# Patient Record
Sex: FEMALE | Race: WHITE
[De-identification: names, ages, dates, MRNs, and addresses within clinical notes are randomized per-mention and may not be internally consistent; named-entity substitution may affect disease eponyms.]

---

## 2018-07-14 ENCOUNTER — HOSPITAL ENCOUNTER (INPATIENT)
Dept: HOSPITAL 43 - DL.OBCHECK | Age: 28
LOS: 1 days | Discharge: HOME | DRG: 560 | End: 2018-07-15
Admitting: FAMILY MEDICINE
Payer: COMMERCIAL

## 2018-07-14 DIAGNOSIS — Z88.2: ICD-10-CM

## 2018-07-14 DIAGNOSIS — Z3A.39: ICD-10-CM

## 2018-07-14 PROCEDURE — 0KQM0ZZ REPAIR PERINEUM MUSCLE, OPEN APPROACH: ICD-10-PCS

## 2018-07-14 RX ADMIN — VITAMIN A, ASCORBIC ACID, CHOLECALCIFEROL, .ALPHA.-TOCOPHEROL ACETATE, DL-, THIAMINE MONONITRATE, RIBOFLAVIN, NIACINAMIDE, PYRIDOXINE HYDROCHLORIDE, FOLIC ACID, CYANOCOBALAMIN, CALCIUM CARBONATE, IRON, ZINC OXIDE, AND CUPRIC OXIDE SCH EACH: 4000; 120; 400; 22; 1.84; 3; 20; 10; 1; 12; 200; 29; 25; 2 TABLET ORAL at 08:31

## 2018-07-14 NOTE — HP
CHIEF COMPLAINT:  "I am in labor."

 

HISTORY OF PRESENT ILLNESS:  Mrs. Flowers is a 28-year-old,  2, para 1-0-0-

1  female.  Last menstrual period on 2017, EDC 2018 by an 8

and 2/7 week ultrasound, EGA 39 weeks' gestation, who reported to Labor and

Delivery at [facility] in [place] with increasing force and frequency of

contractions.  On admission, she was noted to be 4 cm dilated.  After admission,

she had spontaneous rupture of membranes and her contractions became

increasingly painful.  She dilated to 6 cm and desires something for pain so

fentanyl IV was given.  She tolerated her contractions quite well.  She had some

bloody show.  No nausea, vomiting, or diarrhea.  No fever or chills.  No

hematochezia or hematemesis.  No dysuria, frequency, or urgency with urination.

No leg pain, leg edema, or severe back pain.  She did have abdominal pain from

the contractions and she was in distress secondary to the contractions.  She had

no issues throughout the pregnancy.

 

PAST MEDICAL HISTORY:  Positive for depression and reactive airway disease,

which should be handled.  She denies any thyroid disease, cancer, diabetes,

hypertension, heart disease, seizure disorder, thromboembolic disorder, breast

lesions, blood transfusions, kidney disease, or lung problems.

 

FAMILY HISTORY:  Positive for breast cancer in maternal grandmother, depression,

diabetes, hypertension, migraine headaches, multiple sclerosis, Parkinson

syndrome, and uterine cancer.

 

OBSTETRICAL HISTORY:  Normal spontaneous vaginal delivery of a viable female

infant weighing 6 pounds 6 ounces at 40 weeks' gestation on 2017.

 

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  She is 

to [place].  She denies any tobacco use, alcohol use, or illicit drug use.

 

ALLERGIES:  Bactrim and minocycline.

 

 

 

MEDICATIONS:  Prenatal vitamins.

 

PAST SURGICAL HISTORY:  Tonsillectomy, PE tube placement, and wisdom teeth

extractions.

 

REVIEW OF SYSTEMS:

All pertinent positive and negative review of systems per HPI.  All other

systems reviewed and are negative.  A 10-point review of systems discussed with

the patient.  She has no issues.

 

PHYSICAL EXAMINATION:

General:  Well-developed, well-nourished female in acute distress secondary to

the contractions.

HEENT:  Unremarkable.

Neck:  Supple without adenopathy.  No thyromegaly.

Lungs:  Clear to auscultation.  No wheezing, rhonchi, or rales noted.

Cardiovascular:  Regular rate and rhythm without murmurs.

Abdomen:  Gravid.  Fundal height 38 cm.  Fetal heart tones 130s to 140s,

category 1 strip.  Contractions every 2 minutes.

PELVIC:  Cervix on admission was 4 cm, quickly dilated to 6 cm with spontaneous

rupture of membranes and then to an anterior lip.

Extremities:  No edema, erythema, or tenderness noted.

 

LABORATORY DATA:  Blood type O positive.  Antibody screen negative.  Rubella

nonimmune.  RPR nonreactive.  Hepatitis B surface antigen nonreactive.  HIV

nonreactive.  Hepatitis C antibody nonreactive.  Group B strep vaginal culture

negative.

 

ASSESSMENT:

1. 39-week intrauterine pregnancy.

2. Active labor.

3. Spontaneous rupture of membranes.

 

PLAN:

1. Expect vaginal delivery.

2. All questions answered.

3. The patient desires just IV pain medications.

 

DD:  2018 08:28:37

DT:  2018 13:09:46

[facility]

Job #:  [id number]

## 2018-07-14 NOTE — PCM.DEL
L & D Note





- General Info


Date of Service: 18 ( at 0645)


Mother's Due Date: 18 (39 week gestation)





- Delivery Note


Labor: Spontaneous


Delivery Outcome: Livebirth


Infant Delivery Mode: Spontaneous


Birth Presentation: Vertex


Nuchal Cord: None


Anesthesia Type: Local (On the perineum)


Anesthetic: Lidocaine (Xylocaine) 1% Plain


Local Anesthetic Volume: Other (20 mL)


Amniotic Fluid Description: Clear


Episiotomy Type: None


Laceration: 2nd Degree, Perineal


Suture type: Vicryl, Chromic (3-0)


Suture size: 2-0


Placenta: Intact, Spontaneous


Cord: 3 Vessels


Estimated Blood Loss: 450


Resuscitation Needed: No


[place]: Suctioned, Bulb Syringe, Stimulated, Warmed, [place] Used, Warmer Used


 Provider: Flex Mcgraw


APGAR Score 1 min: 8


APGAR Score 5 min: 9


Delivery Comments (Free Text/Narrative):: 





, OA, Viable female infant over 2nd degree 


midline perineal laceration. Cord 3 vessel not


around neck Placenta delivered by simple expression


intact. Mother and infant in good condition


Viable female infat weighing 6 lbs 12 oz


Length 19 inches,  APGAR's 8 and 9.





- General Info


Date of Service: 18 (39 week IUP)





- Review of Systems


General: Reports: No Symptoms


HEENT: Reports: No Symptoms


Pulmonary: Reports: No Symptoms


Cardiovascular: Reports: No Symptoms


Gastrointestinal: Reports: No Symptoms


Genitourinary: Reports: No Symptoms


Musculoskeletal: Reports: No Symptoms


Skin: Reports: No Symptoms


Neurological: Reports: No Symptoms


Psychiatric: Reports: No Symptoms





- Patient Data


Med Orders - Current: 


 Current Medications





Acetaminophen (Tylenol)  650 mg PO Q6H PRN


   PRN Reason: mild pain or fever


Benzocaine/Menthol (Dermoplast Pain Relief Spray)  0 gm TOP Q4H PRN


   PRN Reason: Perineal comfort measures


   Last Admin: 18 08:32 Dose:  1 spray


Carboprost Tromethamine (Hemabate Ds)  250 mcg IM ASDIRECTED PRN


   PRN Reason: Excessive vaginal bleeding


Docusate Sodium (Colace)  100 mg PO BID PRN


   PRN Reason: Constipation


   Last Admin: 18 08:31 Dose:  100 mg


Ferrous Sulfate (Ferrous Sulfate)  325 mg PO WITHBREAKFAST MONIE


Lactated Ringer's (Ringers, Lactated)  1,000 mls @ 125 mls/hr IV ASDIRECTED MONIE


   Last Admin: 18 07:01 Dose:  125 mls/hr


Oxytocin/Sodium Chloride (Pitocin In Ns 30 Unit/500 Ml)  30 unit in 500 mls @ 2 

mls/hr IV TITRATE MONIE; Protocol


Tranexamic Acid 1,000 mg/ (Sodium Chloride)  110 mls @ 660 mls/hr IV ONETIME PRN


   PRN Reason: Bleeding


Ibuprofen (Motrin)  800 mg PO Q8H PRN


   PRN Reason: Mild Pain or Fever


   Last Admin: 18 08:31 Dose:  800 mg


Misoprostol (Cytotec)  800 mcg RECTAL ONETIME PRN


   PRN Reason: Hemorrhage


Prenat Multivit//Iron/Folic Ac (Prenatal Plus Iron)  1 each PO DAILY MONIE


   Last Admin: 18 08:31 Dose:  1 each


Simethicone (Simethicone)  80 mg PO Q4H PRN


   PRN Reason: Gas


Sodium Chloride (Saline Flush)  10 ml FLUSH ASDIRECTED PRN


   PRN Reason: Keep Vein Open


Zolpidem Tartrate (Ambien)  5 mg PO BEDTIME PRN


   PRN Reason: Insomnia





Discontinued Medications





Fentanyl (Sublimaze)  50 mcg IVPUSH ONETIME ONE


   Stop: 18 06:11


   Last Admin: 18 06:59 Dose:  50 mcg


Lidocaine HCl (Xylocaine-Mpf 1%) Confirm Administered Dose 30 ml .ROUTE .STK-

MED ONE


   Stop: 18 06:48


   Last Admin: 18 07:00 Dose:  30 ml


Measles/Mumps/Rubella Vaccine Live (M-M-R Ii Vaccine)  0.5 ml SUBCUT .ONCE ONE


   Stop: 18 08:07











- Exam


General: Alert, Oriented, Cooperative, Moderate Distress (From contractions)


HEENT: Pupils Equal, Pupils Reactive, EOMI, Mucous Membr. Moist/Pink


Neck: Supple


Lungs: Clear to Auscultation, Normal Respiratory Effort


Cardiovascular: Regular Rate, Regular Rhythm, No Murmurs


GI/Abdominal Exam: Normal Bowel Sounds, Soft, Non-Tender


 (Female) Exam: Normal External Exam


Extremities: Normal Inspection, Normal Range of Motion, Non-Tender, No Pedal 

Edema


Skin: Dry, Intact


Neurological: No New Focal Deficit


Psy/Mental Status: Alert, Normal Affect, Normal Mood





- Problem List Review


Problem List Initiated/Reviewed/Updated: Yes





- My Orders


Last 24 Hours: 


My Active Orders





18 06:15


Lactated Ringers [Ringers, Lactated] 1,000 ml IV ASDIRECTED 





18 06:30


Oxytocin/Normal Saline [Pitocin in NS 30 UNIT/500 ML] 30 unit in 500 ml IV 

TITRATE 





18 08:06


Notify Provider Vital Signs OB [RC] ASDIRECTED 


Up ad Marla [RC] ASDIRECTED 


Acetaminophen [Tylenol]   650 mg PO Q6H PRN 


Benzocaine/Menthol [Dermoplast Pain Relief Spray]   See Dose Instructions  TOP 

Q4H PRN 


Carboprost Tromethamine [Hemabate DS]   250 mcg IM ASDIRECTED PRN 


Docusate Sodium [Colace]   100 mg PO BID PRN 


Ibuprofen [Motrin]   800 mg PO Q8H PRN 


Misoprostol [Cytotec]   800 mcg RECTAL ONETIME PRN 


Simethicone   80 mg PO Q4H PRN 


Sodium Chloride 0.9% [Saline Flush]   10 ml FLUSH ASDIRECTED PRN 


Tranexamic Acid [Cyklokapron] 1,000 mg   Sodium Chloride 0.9% [Normal Saline] 

100 ml IV ONETIME 


Zolpidem [Ambien]   5 mg PO BEDTIME PRN 


Assess Lochia [WOMSER] Per Unit Routine 


Assess Uterine Involution [WOMSER] Per Unit Routine 


Breast Pump [WOMSER] Per Unit Routine 


Ice Therapy [OM.PC] Per Unit Routine 


Perineal Care [OM.PC] Per Unit Routine 


Saline Lock Insert [OM.PC] Urgent 


Sitz Bath [OM.PC] Per Unit Routine 


Resuscitation Status Routine 





18 08:07


Vital Signs [RC] PFP 





18 09:00


Prenatal Vit with Ca/FA/Iron [Prenatal Plus Iron]   1 each PO DAILY 





07/15/18 06:00


CBC W/O DIFF,HEMOGRAM [HEME] Routine 





07/15/18 08:00


Ferrous Sulfate   325 mg PO WITHBREAKFAST 














- Assessment


Assessment:: 





39 week IUP


Active Labor





- Plan


Plan:: 





Expect Vaginal Delivery

## 2018-07-15 VITALS — DIASTOLIC BLOOD PRESSURE: 69 MMHG | SYSTOLIC BLOOD PRESSURE: 126 MMHG

## 2018-07-15 RX ADMIN — VITAMIN A, ASCORBIC ACID, CHOLECALCIFEROL, .ALPHA.-TOCOPHEROL ACETATE, DL-, THIAMINE MONONITRATE, RIBOFLAVIN, NIACINAMIDE, PYRIDOXINE HYDROCHLORIDE, FOLIC ACID, CYANOCOBALAMIN, CALCIUM CARBONATE, IRON, ZINC OXIDE, AND CUPRIC OXIDE SCH EACH: 4000; 120; 400; 22; 1.84; 3; 20; 10; 1; 12; 200; 29; 25; 2 TABLET ORAL at 09:40

## 2018-07-15 NOTE — PCM.PNPP
- General Info


Date of Service: 07/15/18 (PPD # 1 S/P )


Functional Status: Reports: Pain Controlled, Tolerating Diet, Ambulating, 

Urinating





- Review of Systems


General: Reports: No Symptoms


HEENT: Reports: No Symptoms


Pulmonary: Reports: No Symptoms


Cardiovascular: Reports: No Symptoms


Gastrointestinal: Reports: No Symptoms


Genitourinary: Reports: No Symptoms


Musculoskeletal: Reports: No Symptoms


Skin: Reports: No Symptoms


Neurological: Reports: No Symptoms


Psychiatric: Reports: No Symptoms





- General Info


Date of Service: 07/15/18 (PPD # 1 S/P )





- Patient Data


Vital Signs - Most Recent: 


 Last Vital Signs











Temp  98.5 F   18 20:00


 


Pulse  81   18 20:00


 


Resp  16   18 20:00


 


BP  128/80   18 20:00


 


Pulse Ox  98   18 20:00











Weight - Most Recent: 221 lb


I&O - Last 24 Hours: 


 Intake & Output











 07/14/18 07/14/18 07/15/18





 14:59 22:59 06:59


 


Intake Total 2400 800 


 


Balance 2400 800 











Med Orders - Current: 


 Current Medications





Acetaminophen (Tylenol)  650 mg PO Q6H PRN


   PRN Reason: mild pain or fever


   Last Admin: 18 13:54 Dose:  650 mg


Benzocaine/Menthol (Dermoplast Pain Relief Spray)  0 gm TOP Q4H PRN


   PRN Reason: Perineal comfort measures


   Last Admin: 18 08:32 Dose:  1 spray


Carboprost Tromethamine (Hemabate Ds)  250 mcg IM ASDIRECTED PRN


   PRN Reason: Excessive vaginal bleeding


Docusate Sodium (Colace)  100 mg PO BID PRN


   PRN Reason: Constipation


   Last Admin: 18 21:56 Dose:  100 mg


Ferrous Sulfate (Ferrous Sulfate)  325 mg PO WITHBREAKFAST MONIE


Lactated Ringer's (Ringers, Lactated)  1,000 mls @ 125 mls/hr IV ASDIRECTED MONIE


   Last Admin: 18 07:01 Dose:  125 mls/hr


Oxytocin/Sodium Chloride (Pitocin In Ns 30 Unit/500 Ml)  30 unit in 500 mls @ 2 

mls/hr IV TITRATE MONIE; Protocol


   Last Titration: 18 09:44 Dose:  Infused


Tranexamic Acid 1,000 mg/ (Sodium Chloride)  110 mls @ 660 mls/hr IV ONETIME PRN


   PRN Reason: Bleeding


Ibuprofen (Motrin)  800 mg PO Q8H PRN


   PRN Reason: Mild Pain or Fever


   Last Admin: 07/15/18 05:28 Dose:  800 mg


Misoprostol (Cytotec)  800 mcg RECTAL ONETIME PRN


   PRN Reason: Hemorrhage


Prenat Multivit//Iron/Folic Ac (Prenatal Plus Iron)  1 each PO DAILY MONIE


   Last Admin: 18 08:31 Dose:  1 each


Simethicone (Simethicone)  80 mg PO Q4H PRN


   PRN Reason: Gas


Sodium Chloride (Saline Flush)  10 ml FLUSH ASDIRECTED PRN


   PRN Reason: Keep Vein Open


Zolpidem Tartrate (Ambien)  5 mg PO BEDTIME PRN


   PRN Reason: Insomnia





Discontinued Medications





Fentanyl (Sublimaze)  50 mcg IVPUSH ONETIME ONE


   Stop: 18 06:11


   Last Admin: 18 06:59 Dose:  50 mcg


Lidocaine HCl (Xylocaine-Mpf 1%) Confirm Administered Dose 30 ml .ROUTE .STK-

MED ONE


   Stop: 18 06:48


   Last Admin: 18 07:00 Dose:  30 ml


Measles/Mumps/Rubella Vaccine Live (M-M-R Ii Vaccine)  0.5 ml SUBCUT .ONCE ONE


   Stop: 18 08:07


   Last Admin: 18 13:59 Dose:  0.5 ml











- Infant Interaction


Infant Disposition, Postpartum: [place] in Room with Family


Infant Interaction: Holding Infant


Infant Feeding:  Infant; Nursed Well


Support Person: 





- Postpartum Recovery Exam


Fundal Tone: Firm


Fundal Level: 2 Fingerbreadths Below Umbilicus


Fundal Placement: Midline


Lochia Amount: Small


Lochia Color: Rubra/Red


Perineum Description: Intact, Minimal Bruising/Swelling


Episiotomy/Laceration: Approximated


Urinary Elimination: Voided





- Exam


General: Alert, Oriented, Cooperative, No Acute Distress


HEENT: Pupils Equal, Pupils Reactive, EOMI


Neck: Supple


Lungs: Clear to Auscultation, Normal Respiratory Effort


Cardiovascular: Regular Rate, Regular Rhythm, No Murmurs


GI/Abdominal Exam: Normal Bowel Sounds, Soft, Non-Tender, No Distention


Extremities: Normal Inspection, Normal Range of Motion, Non-Tender, No Pedal 

Edema


Skin: Warm, Dry, Intact


Wound/Incisions: Healing Well


Neurological: No New Focal Deficit, Normal Gait, Normal Speech, Normal Tone


Psy/Mental Status: Alert, Normal Affect, Normal Mood





- Problem List Review


Problem List Initiated/Reviewed/Updated: Yes





- My Orders


Last 24 Hours: 


My Active Orders





18 06:15


Lactated Ringers [Ringers, Lactated] 1,000 ml IV ASDIRECTED 





18 06:30


Oxytocin/Normal Saline [Pitocin in NS 30 UNIT/500 ML] 30 unit in 500 ml IV 

TITRATE 





18 08:06


Notify Provider Vital Signs OB [RC] ASDIRECTED 


Up ad Marla [RC] ASDIRECTED 


Acetaminophen [Tylenol]   650 mg PO Q6H PRN 


Benzocaine/Menthol [Dermoplast Pain Relief Spray]   See Dose Instructions  TOP 

Q4H PRN 


Carboprost Tromethamine [Hemabate DS]   250 mcg IM ASDIRECTED PRN 


Docusate Sodium [Colace]   100 mg PO BID PRN 


Ibuprofen [Motrin]   800 mg PO Q8H PRN 


Misoprostol [Cytotec]   800 mcg RECTAL ONETIME PRN 


Simethicone   80 mg PO Q4H PRN 


Sodium Chloride 0.9% [Saline Flush]   10 ml FLUSH ASDIRECTED PRN 


Tranexamic Acid [Cyklokapron] 1,000 mg   Sodium Chloride 0.9% [Normal Saline] 

100 ml IV ONETIME 


Zolpidem [Ambien]   5 mg PO BEDTIME PRN 


Assess Lochia [WOMSER] Per Unit Routine 


Assess Uterine Involution [WOMSER] Per Unit Routine 


Breast Pump [WOMSER] Per Unit Routine 


Ice Therapy [OM.PC] Per Unit Routine 


Perineal Care [OM.PC] Per Unit Routine 


Saline Lock Insert [OM.PC] Urgent 


Sitz Bath [OM.PC] Per Unit Routine 


Resuscitation Status Routine 





18 08:07


Vital Signs [RC] 0818 09:00


Prenatal Vit with Ca/FA/Iron [Prenatal Plus Iron]   1 each PO DAILY 





07/15/18 05:25


CBC W/O DIFF,HEMOGRAM [HEME] Routine 





07/15/18 05:55


Ready for Discharge [RC] PER UNIT ROUTINE 





07/15/18 08:00


Ferrous Sulfate   325 mg PO WITHBREAKFAST 














- Assessment


Assessment:: 





PPD # 1 S/P 


Doing well





- Plan


Plan:: 





Discharge to home


Follow-up with Dr. Johnson for recheck


Ibuprofen for pain as needed.

## 2018-07-16 NOTE — DISCH
INDICATION FOR ADMISSION:  Mrs. Flowers is a 28-year-old,  2, para 1-0-0-

1,  female at 39 weeks' gestation, who reported to Labor and Delivery

with increasing force and frequency of contractions.  On admission, she was

noted to be 4 cm dilated.  She then had spontaneous rupture of membranes.  She

quickly dilated to 6 cm.  She tolerated her contractions quite well.  Once she

got to complete, she started pushing, and she had a normal spontaneous vaginal

delivery of a viable female infant, weighing 6 pounds 12 ounces, 19 inches long

with Apgar scores of 8 at 1 minute, 9 at 5 minutes.  This was over a second-

degree midline perineal laceration.  She recovered.  The infant went to the

nursery.  No complications occurred throughout her hospital stay.  She was

afebrile.  Vital signs are stable.  She tolerated her diet well and ambulated

quite well.  She was discharged home on postpartum day #1.

 

LABORATORY AND DIAGNOSTIC STUDIES:  Hemogram 07/15/2018, WBC 11.6, hemoglobin

11.9, hematocrit 36.2, platelet count 174,000.

 

DISCHARGE INSTRUCTIONS:

1. Discharged to home.

2. Follow up with Dr. Johnson at her 6-week checkup or sooner if problems

    develop.

3. No douching, tampons, intercourse for 6 weeks.

4. Discharge instructions including activity, followup, medications, diet, and

    wound care were discussed with the patient.  She understands these and is

    willing to comply with these.

5. Ibuprofen 800 mg 1 tablet t.i.d. p.r.n. for pain.

6. Tylenol p.r.n. for pain.

 

DISCHARGE DIAGNOSES:

1. A 39-week intrauterine pregnancy.

2. Active labor.

3. Spontaneous rupture of membranes.

4. Normal spontaneous vaginal delivery of a viable female infant,

    weighing 6 pounds 12 ounces, 19 inches long with Apgar scores of 8 at 1

    minute, 9 at 5 minutes.

 

DD:  07/15/2018 08:00:25

DT:  07/15/2018 08:11:44

[facility]

Job #:  [id number]

## 2020-09-14 ENCOUNTER — HOSPITAL ENCOUNTER (EMERGENCY)
Dept: HOSPITAL 43 - DL.ED | Age: 30
Discharge: HOME | End: 2020-09-14
Payer: COMMERCIAL

## 2020-09-14 VITALS — DIASTOLIC BLOOD PRESSURE: 90 MMHG | SYSTOLIC BLOOD PRESSURE: 127 MMHG | HEART RATE: 92 BPM
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
## 2020-09-14 DIAGNOSIS — F32.9: ICD-10-CM

## 2020-09-14 DIAGNOSIS — S80.211A: ICD-10-CM

## 2020-09-14 DIAGNOSIS — Z79.899: ICD-10-CM

## 2020-09-14 DIAGNOSIS — S93.402A: Primary | ICD-10-CM

## 2020-09-14 DIAGNOSIS — Z88.2: ICD-10-CM

## 2020-09-14 DIAGNOSIS — D64.9: ICD-10-CM

## 2020-09-14 DIAGNOSIS — J45.909: ICD-10-CM
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
## 2020-09-14 DIAGNOSIS — Z88.1: ICD-10-CM

## 2020-09-14 NOTE — CR
PROCEDURE INFORMATION: 

Exam: XR Left Ankle 

Exam date and time: 9/14/2020 8:55 AM 

Age: 30 years old 

Clinical indication: Other: Twisted lat mal pain 



TECHNIQUE: 

Imaging protocol: XR Left ankle. 

Views: 3 or more views. 



COMPARISON: 

No relevant prior studies available. 



FINDINGS: 

Bones/joints: No acute fracture or dislocation is identified. The ankle mortise 

is preserved on these nonstressed views. 

Soft tissues: The soft tissues are swollen anteriorly. 



IMPRESSION: 

Anterior soft tissue swelling without acute fracture or dislocation identified. 

May consider follow-up in 7 to 10 days or correlation with MRI if symptoms 

persist.

## 2020-09-14 NOTE — EDM.PDOC
ED HPI GENERAL MEDICAL PROBLEM





- General


Stated Complaint: TWISTED ANKLE


Time Seen by Provider: 09/14/20 08:50


Source of Information: Reports: Patient


History Limitations: Reports: No Limitations





- History of Present Illness


INITIAL COMMENTS - FREE TEXT/NARRATIVE: 





Patient comes emergency department today with complaints of an abrasion to her 

right knee as well as a twisted left ankle.  This patient just prior to arrival 

was walking down the steps when she tripped everting her left ankle.  She fell 

landing on her right knee and sustained an abrasion.  She really has no pain in 

her right knee or change in functionality.  Her last tetanus shot was just a 

couple years ago when she had her child.  She complains of pain on the lateral 

malleolus of her left ankle.  She is able to ambulate with some pain.  No 

paresthesia of the left lower extremities.  No head neck or back pain.  No loss 

of consciousness. No COVID exposure no COVID symptoms. 


  ** Left Ankle


Pain Score (Numeric/FACES): 5





- Related Data


                                    Allergies











Allergy/AdvReac Type Severity Reaction Status Date / Time


 


amoxicillin Allergy  Other Verified 09/14/20 08:51


 


minocycline Allergy  Headache Verified 09/14/20 08:51


 


sulfamethoxazole Allergy  Rash Verified 09/14/20 08:51





[From Bactrim]     


 


trimethoprim [From Bactrim] Allergy  Rash Verified 09/14/20 08:51











Home Meds: 


                                    Home Meds





Ferrous Sulfate [Iron] 1 tab PO DAILY 01/02/17 [History]


Ethinyl Estradiol/Drospirenone [Drospirenone-Ee 3-0.02 mg Tab] 1 tab PO DAILY 

09/14/20 [History]


FLUoxetine HCl [Fluoxetine HCl] 20 mg PO DAILY 09/14/20 [History]











Past Medical History


Cardiovascular History: Reports: None


Respiratory History: Reports: Asthma, Other (See Below)


Other Respiratory History: reactive airway disease


Gastrointestinal History: Reports: None


Genitourinary History: Reports: None


OB/GYN History: Reports: Pregnancy


Musculoskeletal History: Reports: None


Neurological History: Reports: None


Psychiatric History: Reports: Depression


Endocrine/Metabolic History: Reports: None


Hematologic History: Reports: Anemia


Immunologic History: Reports: None


Oncologic (Cancer) History: Reports: None


Dermatologic History: Reports: None





- Infectious Disease History


Infectious Disease History: Reports: Chicken Pox





- Past Surgical History


Head Surgeries/Procedures: Reports: None


HEENT Surgical History: Reports: Tonsillectomy





Social & Family History





- Family History


Family Medical History: Noncontributory





- Tobacco Use


Smoking Status *Q: Never Smoker


Second Hand Smoke Exposure: No





- Caffeine Use


Caffeine Use: Reports: Coffee





- Recreational Drug Use


Recreational Drug Use: No





Review of Systems





- Review of Systems


Review Of Systems: Comprehensive ROS is negative, except as noted in HPI.





ED EXAM, GENERAL





- Physical Exam


Exam: See Below


Exam Limited By: No Limitations


General Appearance: Alert, WD/WN, No Apparent Distress


Eye Exam: Bilateral Eye: EOMI, PERRL


Respiratory/Chest: No Respiratory Distress


Cardiovascular: Normal Peripheral Pulses


Back Exam: Normal Inspection, Full Range of Motion.  No: Paraspinal Tenderness, 

Vertebral Tenderness


Extremities: No: Normal Inspection (Just below the patella on the right knee 

there is a superficial abrasion.  There is no overt bony deformity of the knee. 

 There is no effusion of the knee.  The patella slide smoothly without crepitus.

  There is negative pain with varus and valgus stress.  Negative Lockman and 

anterior drawer sign.  Examination of the left ankle shows some tenderness on 

the lateral malleolus without any bony deformity.  No swelling.  She is able to 

flex and extend appropriately.  She has a positive talar tilt to the lateral 

aspect.  CMS appropriately.  No other obvious trauma to the left lower 

extremity.)





Course





- Vital Signs


Last Recorded V/S: 


                                Last Vital Signs











Temp  98.2 F   09/14/20 08:52


 


Pulse  92   09/14/20 08:52


 


Resp  16   09/14/20 08:52


 


BP  127/90   09/14/20 08:52


 


Pulse Ox  100   09/14/20 08:52














- Orders/Labs/Meds


Meds: 


Medications














Discontinued Medications














Generic Name Dose Route Start Last Admin





  Trade Name Vaishali  PRN Reason Stop Dose Admin


 


Ibuprofen  600 mg  09/14/20 08:54  09/14/20 09:45





  Motrin  PO  09/14/20 08:55  Not Given





  ONETIME ONE  














- Radiology Interpretation


Free Text/Narrative:: 





X-ray of the left ankle per radiology shows anterior soft tissue swelling 

without acute fracture or dislocation identified.





- Re-Assessments/Exams


Free Text/Narrative Re-Assessment/Exam: 





09/14/20 09:47


I buprofen


Ice applied. 





Xray negative for fracture. 





stirrup brace with crutches. She has crutches at home. Will also give a cam 

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

 will use the cam walker if needed for work. 





She is comfortable with this plan and her questions answered. 





Departure





- Departure


Time of Disposition: 09:34


Disposition: Home, Self-Care 01


Clinical Impression: 


Ankle sprain


Qualifiers:


 Encounter type: initial encounter Involved ligament of ankle: unspecified 

ligament Laterality: left Qualified Code(s): S93.402A - Sprain of unspecified 

ligament of left ankle, initial encounter





Abrasion, knee


Qualifiers:


 Encounter type: initial encounter Laterality: right Qualified Code(s): S80.211A

 - Abrasion, right knee, initial encounter








- Discharge Information


Instructions:  Ankle Sprain, Easy-to-Read, Pain Medicine Instructions, 

Easy-to-Read, How to Use Cold Therapy, Abrasion, Easy-to-Read


Forms:  ED Department Discharge


Additional Instructions: 


Tylenol and/or ibuprofen as needed for pain.


RICE therapy as per discharge instructions. 


Write the alphabet with your big toe of the affected ankle 4 times a day to keep

 range of motion. 


Cleanse the abrasion twice daily with soap and water. Bacitracin and bandage 

until healed. 


Watch for signs of infection. 


Stirrup ankle brace. Consider wearing the brace for up to 2 weeks after symptoms

 resolve to continue to support the joint. 


Ambulate with the crutches from your brother and the ankle brace. As much weight

 as no pain is induced and slowly progress weight bearing as tolerated. 


Return to the ED if new or worsening symptoms. 


Follow up with PCP in a week if not improving. 





Sepsis Event Note (ED)





- Evaluation


Sepsis Screening Result: No Definite Risk





- Focused Exam


Vital Signs: 


                                   Vital Signs











  Temp Pulse Resp BP Pulse Ox


 


 09/14/20 08:52  98.2 F  92  16  127/90  100

## 2021-10-04 ENCOUNTER — HOSPITAL ENCOUNTER (INPATIENT)
Dept: HOSPITAL 43 - DL.OB | Age: 31
LOS: 1 days | Discharge: HOME | DRG: 560 | End: 2021-10-05
Attending: FAMILY MEDICINE | Admitting: FAMILY MEDICINE
Payer: COMMERCIAL

## 2021-10-04 DIAGNOSIS — F32.A: ICD-10-CM

## 2021-10-04 DIAGNOSIS — O48.0: Primary | ICD-10-CM

## 2021-10-04 DIAGNOSIS — Z88.0: ICD-10-CM

## 2021-10-04 DIAGNOSIS — Z28.82: ICD-10-CM

## 2021-10-04 DIAGNOSIS — Z3A.41: ICD-10-CM

## 2021-10-04 DIAGNOSIS — Z88.1: ICD-10-CM

## 2021-10-04 DIAGNOSIS — J45.909: ICD-10-CM

## 2021-10-04 DIAGNOSIS — Z88.2: ICD-10-CM

## 2021-10-04 DIAGNOSIS — D64.9: ICD-10-CM

## 2021-10-04 PROCEDURE — 10907ZC DRAINAGE OF AMNIOTIC FLUID, THERAPEUTIC FROM PRODUCTS OF CONCEPTION, VIA NATURAL OR ARTIFICIAL OPENING: ICD-10-PCS | Performed by: FAMILY MEDICINE

## 2021-10-04 PROCEDURE — 0KQM0ZZ REPAIR PERINEUM MUSCLE, OPEN APPROACH: ICD-10-PCS | Performed by: FAMILY MEDICINE

## 2021-10-04 PROCEDURE — U0002 COVID-19 LAB TEST NON-CDC: HCPCS

## 2021-10-04 NOTE — PCM.LDHP
L&D History of Present Illness





- General


Date of Service: 10/04/21


Admit Problem/Dx: 


                   Patient Status Order with Admit Dx/Problem





10/04/21 06:43


Patient Status [ADT] Routine 








                           Admission Diagnosis/Problem











Admission Diagnosis/Problem    Post term pregnancy, antepartum

















- History of Present Illness


Introduction:: 


HPI: Juli Lott is a 30 yo,  [id number] (LMP 2020), with excellent 

prenatal care, admitted for induction of labor for post dates. 


GBS Negative, Rubella immune, all infectious disease immune. Blood type O+


Maternal condition affecting pregnancy includes only depression, for which she 

takes Prozac 20mg, other medication exposure in pregnancy includes prenatal 

vitamin. 


Baby's gender is a surprise, but if male, plans for circumcision. Plans to 

breast feed.


Support person is  Hugo, they have 2 daughters at home, Alejandra and 

Kami, both born via normal spontaneous vaginal delivery.





Subjective: Juli Lott presents this morning with her  for induction of 

labor for post dates. She reports no fevers/chills, head ache, RUQ pain, or 

extremity edema, no leakage of fluid, no vaginal bleeding. She endorses what she

 suspects is loss of mucous plug, fetal movement present, feeling contractions. 


FHT Category 1, reactive


Cervical exam revealed cervix dilated to 4 cm, 60% effacement. -1 station.





- Related Data


Allergies/Adverse Reactions: 


                                    Allergies











Allergy/AdvReac Type Severity Reaction Status Date / Time


 


amoxicillin Allergy  Other Verified 10/04/21 08:41


 


minocycline Allergy  Headache Verified 10/04/21 08:41


 


sulfamethoxazole Allergy  Rash Verified 10/04/21 08:41





[From Bactrim]     


 


trimethoprim [From Bactrim] Allergy  Rash Verified 10/04/21 08:41











Home Medications: 


                                    Home Meds





Ferrous Sulfate [Iron] 1 tab PO DAILY 17 [History]


FLUoxetine HCl [Fluoxetine HCl] 20 mg PO DAILY 20 [History]


Pnv No.95/Ferrous Fum/Folic AC [Prenavite Tablet] 1 each PO DAILY 21 

[History]











Past Medical History


Cardiovascular History: Reports: None


Respiratory History: Reports: Asthma, Other (See Below)


Other Respiratory History: reactive airway disease


Gastrointestinal History: Reports: None


Genitourinary History: Reports: None


OB/GYN History: Reports: Pregnancy


Musculoskeletal History: Reports: None


Neurological History: Reports: None


Psychiatric History: Reports: Depression


Endocrine/Metabolic History: Reports: None


Hematologic History: Reports: Anemia


Immunologic History: Reports: None


Oncologic (Cancer) History: Reports: None


Dermatologic History: Reports: None





- Infectious Disease History


Infectious Disease History: Reports: Chicken Pox





- Past Surgical History


Head Surgeries/Procedures: Reports: None


HEENT Surgical History: Reports: Tonsillectomy





Social & Family History





- Family History


Family Medical History: No Pertinent Family History





- Caffeine Use


Caffeine Use: Reports: Coffee





- Living Situation & Occupation


Living situation: Reports: 


Occupation: Employed





H&P Review of Systems





- Review of Systems:


Review Of Systems: Comprehensive ROS is negative, except as noted in HPI.





L&D Exam





- Exam


Exam: See Below





- Levine Score


Levine Score Consistency: Soft


Levine Score Effacement: 51-70%


Levine Score Dilation: 3-4 cm


Levine Score Infant's Station: -1 ,0





- Exam


General: Alert, Oriented


HEENT: Conjunctiva Clear, Mucosa Moist & Pink


Neck: Supple, Trachea Midline


Lungs: Clear to Auscultation, Normal Respiratory Effort


Cardiovascular: Regular Rate, Regular Rhythm


GI/Abdominal Exam: Soft, Non-Tender


Back Exam: Normal Inspection, Full Range of Motion


Extremities: Normal Inspection, No Pedal Edema


Skin: Warm, Dry, Intact


Neurological: Cranial Nerves Intact, Reflexes Equal Bilateral


Psychiatric: Alert, Normal Affect, Normal Mood





- Patient Data


Result Diagrams: 


                                 10/04/21 08:00








- Problem List


(1) Pregnancy


SNOMED Code(s): 63834286


   ICD Code: Z34.90 - ENCNTR FOR SUPRVSN OF NORMAL PREGNANCY, UNSP, UNSP TRIMES

TER   Status: Acute   Current Visit: No   


Qualifiers: 


   Weeks of gestation: 41 weeks   Qualified Code(s): Z3A.41 - 41 weeks gestation

 of pregnancy   





(2) Encounter for induction of labor


SNOMED Code(s): 044300659


   ICD Code: Z34.90 - ENCNTR FOR SUPRVSN OF NORMAL PREGNANCY, UNSP, UNSP 

TRIMESTER   Status: Acute   Current Visit: No   





(3) Artificial rupture of membranes antepartum


SNOMED Code(s): 915355563


   ICD Code: [id number] -    Status: Acute   Current Visit: Yes   


Problem List Initiated/Reviewed/Updated: Yes


Orders Last 24hrs: 


                               Active Orders 24 hr











 Category Date Time Status


 


 Patient Status [ADT] Routine ADT  10/04/21 06:43 Ordered


 


 Communication Order [RC] ASDIRECTED Care  10/04/21 06:43 Ordered


 


 Communication Order [RC] ASDIRECTED Care  10/04/21 06:43 Ordered


 


 Communication Order [RC] ASDIRECTED Care  10/04/21 06:43 Ordered


 


 Communication Order [RC] ASDIRECTED Care  10/04/21 06:43 Ordered


 


 Communication Order [RC] ASDIRECTED Care  10/04/21 06:43 Ordered


 


 Communication Order [RC] ASDIRECTED Care  10/04/21 06:43 Ordered


 


 Fetal Heart Tones [RC] PER UNIT ROUTINE Care  10/04/21 06:43 Ordered


 


 Fetal Monitoring [RC] PER UNIT ROUTINE Care  10/04/21 06:43 Ordered


 


 Notify Provider Vital Signs OB [RC] ASDIRECTED Care  10/04/21 06:43 Ordered


 


 Notify Provider [RC] PRN Care  10/04/21 06:43 Ordered


 


 Notify Provider [RC] PRN Care  10/04/21 06:43 Ordered


 


 Notify Provider [RC] PRN Care  10/04/21 06:43 Ordered


 


 Notify Provider [RC] STAT Care  10/04/21 06:43 Ordered


 


 Peripheral IV Care [RC] .AS DIRECTED Care  10/04/21 06:49 Ordered


 


 Pump Management, Intrathecal [RC] ASDIRECTED Care  10/04/21 06:49 Ordered


 


 Up ad Marla [RC] ASDIRECTED Care  10/04/21 06:43 Ordered


 


 Up ad Marla [RC] PER UNIT ROUTINE Care  10/04/21 06:43 Ordered


 


 Vaginal Exam [RC] PRN Care  10/04/21 06:43 Ordered


 


 Vital Signs [RC] PER UNIT ROUTINE Care  10/04/21 06:43 Ordered


 


 Vital Signs [RC] PER UNIT ROUTINE Care  10/04/21 06:43 Ordered


 


 CBC W/O DIFF,HEMOGRAM [HEME] Routine Lab  10/04/21 06:43 Ordered


 


 CORONAVIRUS COVID-19 ANDREZ [MOLEC] Stat Lab  10/04/21 06:43 Ordered


 


 Acetaminophen [TylenoL] Med  10/04/21 06:42 Ordered





 650 mg PO Q4H PRN   


 


 Carboprost Tromethamine [Hemabate DS] Med  10/04/21 06:42 Ordered





 250 mcg IM ASDIRECTED PRN   


 


 Lactated Ringers @ 125 MLS/HR(1000ml) Med  10/04/21 06:45 Ordered





 Lactated Ringers [Ringers, Lactated] 1,000 ml   





 IV ASDIRECTED   


 


 Lactated Ringers [Ringers, Lactated] 1,000 ml Med  10/04/21 06:42 Ordered





 IV BOLUS   


 


 Lidocaine 1% [Xylocaine-MPF 1%] Med  10/04/21 06:42 Ordered





 30 ml INJECT ASDIRECTED PRN   


 


 Methylergonovine [Methergine] Med  10/04/21 06:42 Ordered





 0.2 mg IM ASDIRECTED PRN   


 


 Ondansetron [Zofran] Med  10/04/21 06:42 Ordered





 4 mg IVPUSH Q4H PRN   


 


 Oxytocin 30 Units in NS @ 2 MUNITS/MIN(500ml) Med  10/04/21 06:45 Ordered





 Oxytocin/Normal Saline [Pitocin in NS 30 UNIT/500 ML]   





 30 unit in 500 ml IV TITRATE   


 


 Oxytocin 30 Units in NS @ 2 MUNITS/MIN(500ml) Med  10/04/21 06:45 Ordered





 Oxytocin/Normal Saline [Pitocin in NS 30 UNIT/500 ML]   





 30 unit in 500 ml IV TITRATE   


 


 Sodium Chloride 0.9% [Saline Flush] Med  10/04/21 06:42 Ordered





 10 ml FLUSH ASDIRECTED PRN   


 


 Tranexamic Acid [Cyklokapron] 1,000 mg Med  10/04/21 06:42 Ordered





 Sodium Chloride 0.9% [Normal Saline] 100 ml   





 IV ONETIME   


 


 miSOPROStoL [Cytotec] Med  10/04/21 06:42 Ordered





 800 mcg RECTAL ASDIRECTED PRN   


 


 Peripheral IV Insertion Adult [OM.PC] Urgent Oth  10/04/21 06:43 Ordered


 


 Saline Lock Insert [OM.PC] Routine Oth  10/04/21 06:43 Ordered


 


 Resuscitation Status Routine Resus Stat  10/04/21 06:42 Ordered











Assessment/Plan Comment:: 


1. 41 and 0/7 weeks intrauterine pregnancy, based on LMP.


2.  3, Para 2-0-0-2


3. Artificial rupture of membranes at 0835.





Patient desires minimal intervention for labor and delivery.


Admit for induction of labor for post dates, due to favorable cervical exam and 

tocometer showing >3 contractions in 10 minutes, not a candidate for vaginal 

cytotec. Artificial Rupture of membranes performed at 0835 at time of cervical 

exam showing clear fluid, fetal head well applied, will recheck in 1 hour, if no

 change, will start Pitocin.


Nitrous oxide for pain - pending negative rapid covid test, also have IM Nubaine

 available, Intrathecal if desired.


Anticipate vaginal delivery.





Discussed plan with patient and she is in agreement, all questions answered.

## 2021-10-04 NOTE — PCM.DEL
L & D Note





- General Info


Date of Service: 10/04/21


Mother's Due Date: 21





- Delivery Note


Labor: Spontaneous, Augmented by ARM, Augmented by Oxytocin


Delivery Outcome: Livebirth


Infant Delivery Method: Spontaneous Vaginal Delivery-Single


Infant Delivery Mode: Spontaneous


Birth Presentation: Unable to Assess (Occiput anterior)


Nuchal Cord: None


Anesthesia Type: Nitrous Oxide


Anesthetic: Lidocaine (Xylocaine) 1% Plain


Episiotomy Type: None


Laceration: 2nd Degree


Suture type: Vicryl


Suture size: 3-0


Placenta: Intact, Spontaneous


Cord: 3 Vessels


Estimated Blood Loss: 300


: Suctioned, Bulb Syringe, Stimulated


Second Stage Interventions: Reports: Pushing Effectively





- General Info


Date of Service: 10/04/21


Admission Dx/Problem (Free Text): 


                   Patient Status Order with Admit Dx/Problem





10/04/21 06:43


Patient Status [ADT] Routine 








                           Admission Diagnosis/Problem











Admission Diagnosis/Problem    Normal spontaneous vaginal delivery














Subjective Update: 


Juli Lott is a 30yo G3, now  at 41 and 0/7 days who presented for induction 

of labor on 10/4/2021.  Category 1 tracing upon admission. She was dilated to 4 

cm upon admission, and >3 contractions were traced in 10 minutes, therefore 

cytotec was contraindicated. She progressed to complete dilation by 1235, with  

augmentation of pitocin. Artificial rupture of membranes at 0835 with clear 

fluid. She began pushing at approximately 1245. Delivered a liveborn male infant

 from the dorsal lithotomy position, over a 2nd degree perineal laceration at 

1247. Vigorous infant with spontaneous cry.  





Placenta delivered spontaneously intact with a 3 vessel cord. IV Pitocin was 

started shortly after delivery of the placenta.   mL.





2nd degree perineal laceration repaired with 3-0 Vicryl suture.  Hemostasis 

confirmed. 





Mother and infant doing well. Skin to skin initiated, odonnell hour protected, and

 infant breastfeeding well.  


Functional Status: Reports: Pain Controlled





- Review of Systems


General: Reports: No Symptoms


HEENT: Reports: No Symptoms


Pulmonary: Reports: No Symptoms


Cardiovascular: Reports: No Symptoms


Gastrointestinal: Reports: No Symptoms


Genitourinary: Reports: No Symptoms


Musculoskeletal: Reports: No Symptoms


Skin: Reports: No Symptoms


Neurological: Reports: No Symptoms


Psychiatric: Reports: No Symptoms





- Patient Data


Vitals - Most Recent: 


                                Last Vital Signs











Temp  98.6 F   10/04/21 09:57


 


Pulse  75   10/04/21 09:57


 


Resp  16   10/04/21 07:58


 


BP  122/76   10/04/21 09:57


 


Pulse Ox      











Weight - Most Recent: 196 lb


Lab Results Last 24 Hours: 


                         Laboratory Results - last 24 hr











  10/04/21 10/04/21 Range/Units





  07:55 08:00 


 


WBC   10.9 H  (5.0-10.0)  10^3/uL


 


RBC   4.53  (4.2-5.4)  10^6/uL


 


Hgb   13.1  (12.0-16.0)  g/dL


 


Hct   39.5  (37.0-47.0)  %


 


MCV   87.2  ()  fL


 


MCH   28.9  (27.0-34.0)  pg


 


MCHC   33.2  (33.0-35.0)  g/dL


 


Plt Count   204  (150-450)  10^3/uL


 


SARS-CoV-2 RNA (ANDREZ)  Negative   (NEGATIVE)  











Med Orders - Current: 


                               Current Medications





Acetaminophen (Acetaminophen 325 Mg Tab)  650 mg PO Q4H PRN


   PRN Reason: Pain (Mild 1-3) and fever


Carboprost Tromethamine (Carboprost Tromethamine 250 Mcg/1 Ml Amp)  250 mcg IM 

ASDIRECTED PRN


   PRN Reason: HEMORRHAGE


Tranexamic Acid 1,000 mg/ (Sodium Chloride)  110 mls @ 660 mls/hr IV ONETIME PRN


   PRN Reason: Bleeding


Oxytocin/Sodium Chloride (Pitocin In Ns 30 Unit/500 Ml)  30 unit in 500 mls @ 2 

mls/hr IV TITRATE MONIE; Protocol


Oxytocin/Sodium Chloride (Pitocin In Ns 30 Unit/500 Ml)  30 unit in 500 mls @ 2 

mls/hr IV TITRATE MONIE; Protocol


   Last Admin: 10/04/21 10:25 Dose:  2 munits/min, 2 mls/hr


   Documented by: 


Lactated Ringer's (Ringers, Lactated)  1,000 mls @ 125 mls/hr IV ASDIRECTED MONIE


   Last Admin: 10/04/21 10:25 Dose:  125 mls/hr


   Documented by: 


Lidocaine HCl (Lidocaine 1% 30 Ml Sdv)  30 ml INJECT ASDIRECTED PRN


   PRN Reason: Perineal Repair


   Last Admin: 10/04/21 12:57 Dose:  30 ml


   Documented by: 


Methylergonovine Maleate (Methylergonovine 0.2 Mg/1 Ml Amp)  0.2 mg IM 

ASDIRECTED PRN


   PRN Reason: Hemorrhage


Misoprostol (Misoprostol 400 Mcg  (4 X 100 Mcg Tab))  800 mcg RECTAL ASDIRECTED 

PRN


   PRN Reason: Hemorrhage


Ondansetron HCl (Ondansetron 4 Mg/2 Ml Sdv)  4 mg IVPUSH Q4H PRN


   PRN Reason: Nausea/Vomiting


   Last Admin: 10/04/21 11:36 Dose:  4 mg


   Documented by: 


Sodium Chloride (Sodium Chloride 0.9% 10 Ml Syringe)  10 ml FLUSH ASDIRECTED PRN


   PRN Reason: Keep Vein Open





Discontinued Medications





Lactated Ringer's (Ringers, Lactated)  1,000 mls @ 999 mls/hr IV BOLUS ONE


   Stop: 10/04/21 07:42


Nalbuphine HCl (Nalbuphine 10 Mg/1 Ml Vial)  20 mg IM ONETIME ONE


   Stop: 10/04/21 07:33











- Exam


General: Alert, Oriented


HEENT: Pupils Equal, Pupils Reactive, EOMI, Mucous Membr. Moist/Pink


Neck: Supple


Lungs: Clear to Auscultation, Normal Respiratory Effort


Cardiovascular: Regular Rate, Regular Rhythm


GI/Abdominal Exam: Normal Bowel Sounds, Soft


 (Female) Exam: Vaginal Tears


Back Exam: Normal Inspection, Full Range of Motion


Extremities: Normal Inspection


Skin: Warm, Dry, Intact


Neurological: No New Focal Deficit


Psy/Mental Status: Alert, Normal Affect, Normal Mood





- Problem List & Annotations


(1)  (normal spontaneous vaginal delivery)


SNOMED Code(s): 71724477, 496787732


   Code(s): O80 - ENCOUNTER FOR FULL-TERM UNCOMPLICATED DELIVERY   Status: Acute

   Current Visit: Yes   





(2) Pregnancy


SNOMED Code(s): 80098877


   Code(s): Z34.90 - ENCNTR FOR SUPRVSN OF NORMAL PREGNANCY, UNSP, UNSP 

TRIMESTER   Status: Acute   Current Visit: No   


Qualifiers: 


   Weeks of gestation: 41 weeks   Qualified Code(s): Z3A.41 - 41 weeks gestation

 of pregnancy   





(3) Encounter for induction of labor


SNOMED Code(s): 701832727


   Code(s): Z34.90 - ENCNTR FOR SUPRVSN OF NORMAL PREGNANCY, UNSP, UNSP 

TRIMESTER   Status: Acute   Current Visit: No   





(4) Artificial rupture of membranes antepartum


SNOMED Code(s): 011956814


   Code(s): ARI0814 -    Status: Acute   Current Visit: Yes   





(5) Second degree perineal laceration during delivery, delivered


SNOMED Code(s): 9991951, 930441583


   Code(s): O70.1 - SECOND DEGREE PERINEAL LACERATION DURING DELIVERY   Status: 

Acute   Current Visit: Yes   





- Problem List Review


Problem List Initiated/Reviewed/Updated: Yes





- My Orders


Last 24 Hours: 


My Active Orders





10/04/21 06:42


Acetaminophen [TylenoL]   650 mg PO Q4H PRN 


Carboprost Tromethamine [Hemabate DS]   250 mcg IM ASDIRECTED PRN 


Lidocaine 1% [Xylocaine-MPF 1%]   30 ml INJECT ASDIRECTED PRN 


Methylergonovine [Methergine]   0.2 mg IM ASDIRECTED PRN 


Ondansetron [Zofran]   4 mg IVPUSH Q4H PRN 


Sodium Chloride 0.9% [Saline Flush]   10 ml FLUSH ASDIRECTED PRN 


Tranexamic Acid [Cyklokapron] 1,000 mg   Sodium Chloride 0.9% [Normal Saline] 

100 ml IV ONETIME 


miSOPROStoL [Cytotec]   800 mcg RECTAL ASDIRECTED PRN 


Resuscitation Status Routine 





10/04/21 06:43


Patient Status [ADT] Routine 


Notify Provider Vital Signs OB [RC] ASDIRECTED 


Up ad Marla [RC] ASDIRECTED 


Vaginal Exam [RC] PRN 


Vital Signs [RC] PER UNIT ROUTINE 


Peripheral IV Insertion Adult [OM.PC] Urgent 


Saline Lock Insert [OM.PC] Routine 





10/04/21 06:45


Lactated Ringers [Ringers, Lactated] 1,000 ml IV ASDIRECTED 


Oxytocin/Normal Saline [Pitocin in NS 30 UNIT/500 ML] 30 unit in 500 ml IV 

TITRATE 


Oxytocin/Normal Saline [Pitocin in NS 30 UNIT/500 ML] 30 unit in 500 ml IV 

TITRATE 





10/04/21 06:49


Peripheral IV Care [RC] 06,14,22 


Pump Management, Intrathecal [RC] ASDIRECTED 














- Plan


Plan:: 


1. 41 and 0/7 weeks intrauterine pregnancy, based on LMP.


2.  3, now Para 3-0-0-3


3. Artificial rupture of membranes at 0835.


4. Normal spontaneous vaginal delivery


5. 2nd degree perineal lac, repaired.





Anticipate normal postpartum cares, may discharge at 24 hours


Discussed plan with patient and she is in agreement, all questions answered.

## 2021-10-05 VITALS — DIASTOLIC BLOOD PRESSURE: 69 MMHG | SYSTOLIC BLOOD PRESSURE: 120 MMHG | HEART RATE: 96 BPM

## 2021-10-05 NOTE — [id number]
**Discharge Summary





- Hospital Course


Free Text/Narrative:: 


Juli Lott is a 32yo G3, now  at 41 and 0/7 days who presented for induction 

of labor on 10/4/2021.  Category 1 tracing upon admission. She was dilated to 4 

cm upon admission, and >3 contractions were traced in 10 minutes, therefore 

cytotec was contraindicated. She progressed to complete dilation by 1235, with  

augmentation of pitocin. Artificial rupture of membranes at 0835 with clear 

fluid. She began pushing at approximately 1245. Delivered a liveborn male infant

from the dorsal lithotomy position, over a 2nd degree perineal laceration at 

1247. Vigorous infant with spontaneous cry.  


Placenta delivered spontaneously intact with a 3 vessel cord. IV Pitocin was 

started shortly after delivery of the placenta.   mL.


2nd degree perineal laceration repaired with 3-0 Vicryl suture.  Hemostasis 

confirmed. 


Mother and infant doing well. Skin to skin initiated, odonnell hour protected, and

infant breastfeeding well. 





- Discharge Data


Discharge Date: 10/05/21


Discharge Disposition: Home, Self-Care 01


Condition: Good





- Referral to Home Health


Primary Care Physician: 


WILFRID Perez MD








- Discharge Diagnosis/Problem(s)


(1)  (normal spontaneous vaginal delivery)


SNOMED Code(s): 25938470, 030854165


   ICD Code: O80 - ENCOUNTER FOR FULL-TERM UNCOMPLICATED DELIVERY   Status: 

Acute   





(2) Pregnancy


SNOMED Code(s): 18950494


   ICD Code: Z34.90 - ENCNTR FOR SUPRVSN OF NORMAL PREGNANCY, UNSP, UNSP 

TRIMESTER   Status: Acute   


Qualifiers: 


   Weeks of gestation: 41 weeks   Qualified Code(s): Z3A.41 - 41 weeks gestation

of pregnancy   





(3) Encounter for induction of labor


SNOMED Code(s): 111214656


   ICD Code: Z34.90 - ENCNTR FOR SUPRVSN OF NORMAL PREGNANCY, UNSP, UNSP 

TRIMESTER   Status: Acute   





(4) Second degree perineal laceration during delivery, delivered


SNOMED Code(s): 1365262, 076156909


   ICD Code: O70.1 - SECOND DEGREE PERINEAL LACERATION DURING DELIVERY   Status:

Acute   





- Patient Summary/Data


Consults: 


                                  Consultations





10/04/21 13:29


Consult to Lactation Consultant [CONS] Routine 














- Patient Instructions


Diet: Regular Diet as Tolerated


Activity: As Tolerated


Showering/Bathing: May Shower


Notify Provider of: Fever, Increased Pain, Swelling and Redness, Drainage, 

Nausea and/or Vomiting





- Discharge Plan


*PRESCRIPTION DRUG MONITORING PROGRAM REVIEWED*: Yes


*COPY OF PRESCRIPTION DRUG MONITORING REPORT IN PATIENT ANURAG: Yes


Home Medications: 


                                    Home Meds





Ferrous Sulfate [Iron] 1 tab PO DAILY 17 [History]


FLUoxetine HCl [Fluoxetine HCl] 20 mg PO DAILY 20 [History]


Pnv No.95/Ferrous Fum/Folic AC [Prenavite Tablet] 1 each PO DAILY 21 

[History]








Patient Handouts:  Postpartum Baby Blues, Care of a Perineal Tear, Postpartum 

Care After Vaginal Delivery


Referrals: 


Jyoti Perez MD [Primary Care Provider] -  (Please schedule 6 week 

postpartum appointment. )





- Discharge Summary/Plan Comment


DC Time >30 min.: No


Total # of Minutes for Discharge Time: 





25








- General Info


Date of Service: 10/05/21


Admission Dx/Problem (Free Text: 


                   Patient Status Order with Admit Dx/Problem





10/04/21 06:43


Patient Status [ADT] Routine 








                           Admission Diagnosis/Problem











Admission Diagnosis/Problem    Normal spontaneous vaginal delivery














Subjective Update: 


Juli Lott reports no acute overnight events. She says her pain is well controlled 

with ibuprofen and tylenol, only endorsing minor pelvic cramping associated with

 breast feeding. No fevers, headaches, chest pain, shortness of breath, RUQ 

pain, or extremity edema. Mild lochia.


Functional Status: Reports: Pain Controlled





- Review of Systems


Systems Review Comment: 


Pertinent items noted above.





- Patient Data


Vitals - Most Recent: 


                                Last Vital Signs











Temp  97.4 F   10/05/21 08:00


 


Pulse  96   10/05/21 08:00


 


Resp  16   10/05/21 08:00


 


BP  120/69   10/05/21 08:00


 


Pulse Ox      











Weight - Most Recent: 196 lb


Lab Results - Last 24 hrs: 


                         Laboratory Results - last 24 hr











  10/05/21 Range/Units





  05:40 


 


WBC  12.3 H  (5.0-10.0)  10^3/uL


 


RBC  3.67 L  (4.2-5.4)  10^6/uL


 


Hgb  10.8 L D  (12.0-16.0)  g/dL


 


Hct  33.0 L  (37.0-47.0)  %


 


MCV  89.9  ()  fL


 


MCH  29.4  (27.0-34.0)  pg


 


MCHC  32.7 L  (33.0-35.0)  g/dL


 


Plt Count  195  (150-450)  10^3/uL











Med Orders - Current: 


                               Current Medications





Acetaminophen (Acetaminophen 325 Mg Tab)  650 mg PO Q4H PRN


   PRN Reason: Pain (Mild 1-3) and fever


   Last Admin: 10/05/21 05:27 Dose:  650 mg


   Documented by: 


Benzocaine/Menthol (Benzocaine/Menthol 20%-0.5% Spray 78 Gm Cannister)  0 gm TOP

 Q4H PRN


   PRN Reason: Perineal comfort measures


   Last Admin: 10/04/21 14:23 Dose:  1 spray


   Documented by: 


Carboprost Tromethamine (Carboprost Tromethamine 250 Mcg/1 Ml Amp)  250 mcg IM 

ASDIRECTED PRN


   PRN Reason: HEMORRHAGE


Docusate Sodium (Docusate Sodium 100 Mg Cap)  100 mg PO BID PRN


   PRN Reason: Constipation


   Last Admin: 10/05/21 08:27 Dose:  100 mg


   Documented by: 


Fluoxetine HCl (Fluoxetine 10 Mg Cap)  20 mg PO DAILY MONIE


   Last Admin: 10/05/21 08:27 Dose:  20 mg


   Documented by: 


Tranexamic Acid 1,000 mg/ (Sodium Chloride)  110 mls @ 660 mls/hr IV ONETIME PRN


   PRN Reason: Bleeding


Ibuprofen (Ibuprofen 800 Mg Tab)  800 mg PO Q8H PRN


   PRN Reason: Cramping


   Last Admin: 10/05/21 08:27 Dose:  800 mg


   Documented by: 


Methylergonovine Maleate (Methylergonovine 0.2 Mg/1 Ml Amp)  0.2 mg IM 

ASDIRECTED PRN


   PRN Reason: Hemorrhage


Misoprostol (Misoprostol 400 Mcg  (4 X 100 Mcg Tab))  800 mcg RECTAL ASDIRECTED 

PRN


   PRN Reason: Hemorrhage


Ondansetron HCl (Ondansetron 4 Mg/2 Ml Sdv)  4 mg IVPUSH Q4H PRN


   PRN Reason: Nausea/Vomiting


   Last Admin: 10/04/21 11:36 Dose:  4 mg


   Documented by: 


Oxytocin (Oxytocin 10 Units/1 Ml Sdv)  10 unit IM ONETIME PRN


   PRN Reason: Bleeding


Prenat Multivit/Candler/Iron/Folic Ac (Prenatal Multivitamin With Calcium/Folic 

Acid/Iron Tab)  1 each PO DAILY MONIE


   Last Admin: 10/05/21 08:27 Dose:  1 each


   Documented by: 


Simethicone (Simethicone 80 Mg Tab.Chew)  80 mg PO Q4H PRN


   PRN Reason: Gas


Sodium Chloride (Sodium Chloride 0.9% 10 Ml Syringe)  10 ml FLUSH ASDIRECTED PRN


   PRN Reason: Keep Vein Open


Witch Hazel (Witch Hazel Medicated Pads 100/Jar)  1 pad TOP ASDIRECTED PRN


   PRN Reason: Perineal Comfort Measure


   Last Admin: 10/04/21 14:23 Dose:  1 pad


   Documented by: 





Discontinued Medications





Ferrous Sulfate (Ferrous Sulfate 325 Mg Tab)  325 mg PO DAILY MONIE


Oxytocin/Sodium Chloride (Pitocin In Ns 30 Unit/500 Ml)  30 unit in 500 mls @ 2 

mls/hr IV TITRATE MONIE; Protocol


Oxytocin/Sodium Chloride (Pitocin In Ns 30 Unit/500 Ml)  30 unit in 500 mls @ 2 

mls/hr IV TITRATE MONIE; Protocol


   Last Titration: 10/04/21 15:25 Dose:  0 munits/min, 0 mls/hr


   Documented by: 


Lactated Ringer's (Ringers, Lactated)  1,000 mls @ 999 mls/hr IV BOLUS ONE


   Stop: 10/04/21 07:42


   Last Admin: 10/04/21 13:51 Dose:  Not Given


   Documented by: 


Lactated Ringer's (Ringers, Lactated)  1,000 mls @ 125 mls/hr IV ASDIRECTED MONIE


   Last Admin: 10/04/21 10:25 Dose:  125 mls/hr


   Documented by: 


Lidocaine HCl (Lidocaine 1% 30 Ml Sdv)  30 ml INJECT ASDIRECTED PRN


   PRN Reason: Perineal Repair


   Last Admin: 10/04/21 12:57 Dose:  30 ml


   Documented by: 


Nalbuphine HCl (Nalbuphine 10 Mg/1 Ml Vial)  20 mg IM ONETIME ONE


   Stop: 10/04/21 07:33


   Last Admin: 10/04/21 13:51 Dose:  Not Given


   Documented by: 











- Exam


General: Reports: Alert, Oriented, No Acute Distress


HEENT: Reports: Pupils Equal, Pupils Reactive, EOMI, Mucous Membr. Moist/Pink


Neck: Reports: Supple


Lungs: Reports: Clear to Auscultation, Normal Respiratory Effort


Cardiovascular: Reports: Regular Rate, Regular Rhythm


GI/Abdominal Exam: Normal Bowel Sounds, Soft, Tender


 (Female) Exam: Fundal Height (2 fingerwidths below umbilicus. Uterus Firm. )


Back Exam: Reports: Normal Inspection, Full Range of Motion


Extremities: Normal Inspection, No Pedal Edema


Skin: Reports: Warm, Dry, Intact


Wound/Incisions: Reports: Healing Well


Neurological: Reports: No New Focal Deficit


Psy/Mental Status: Reports: Alert, Normal Affect, Normal Mood





Discharge Operative/Procedures





- Procedures Performed


Operations: 2nd degree perineal lac repaired.

## 2022-12-31 ENCOUNTER — HOSPITAL ENCOUNTER (EMERGENCY)
Dept: HOSPITAL 43 - DL.ED | Age: 32
LOS: 1 days | Discharge: HOME | End: 2023-01-01
Payer: COMMERCIAL

## 2022-12-31 VITALS — SYSTOLIC BLOOD PRESSURE: 125 MMHG | HEART RATE: 111 BPM | DIASTOLIC BLOOD PRESSURE: 85 MMHG

## 2022-12-31 DIAGNOSIS — Z88.2: ICD-10-CM

## 2022-12-31 DIAGNOSIS — Z3A.08: ICD-10-CM

## 2022-12-31 DIAGNOSIS — Z88.0: ICD-10-CM

## 2022-12-31 DIAGNOSIS — O21.9: Primary | ICD-10-CM

## 2022-12-31 DIAGNOSIS — Z86.16: ICD-10-CM

## 2022-12-31 DIAGNOSIS — J10.1: ICD-10-CM

## 2022-12-31 DIAGNOSIS — Z79.899: ICD-10-CM

## 2022-12-31 DIAGNOSIS — Z20.822: ICD-10-CM

## 2022-12-31 LAB
ANION GAP SERPL CALC-SCNC: 12.2 MEQ/L (ref 7–13)
CHLORIDE SERPL-SCNC: 100 MMOL/L (ref 98–107)
EGFRCR SERPLBLD CKD-EPI 2021: 107 ML/MIN (ref 60–?)
RSV RNA UPPER RESP QL NAA+PROBE: NEGATIVE
SARS-COV-2 RNA RESP QL NAA+PROBE: NEGATIVE
SODIUM SERPL-SCNC: 139 MMOL/L (ref 136–145)

## 2022-12-31 PROCEDURE — 99284 EMERGENCY DEPT VISIT MOD MDM: CPT

## 2022-12-31 PROCEDURE — 36415 COLL VENOUS BLD VENIPUNCTURE: CPT

## 2022-12-31 PROCEDURE — 80053 COMPREHEN METABOLIC PANEL: CPT

## 2022-12-31 PROCEDURE — 0241U: CPT

## 2022-12-31 PROCEDURE — 84702 CHORIONIC GONADOTROPIN TEST: CPT

## 2022-12-31 PROCEDURE — 96361 HYDRATE IV INFUSION ADD-ON: CPT

## 2022-12-31 PROCEDURE — 96374 THER/PROPH/DIAG INJ IV PUSH: CPT

## 2022-12-31 PROCEDURE — 85025 COMPLETE CBC W/AUTO DIFF WBC: CPT

## 2023-08-15 ENCOUNTER — HOSPITAL ENCOUNTER (INPATIENT)
Dept: HOSPITAL 43 - DL.OBCHECK | Age: 33
LOS: 1 days | Discharge: HOME | DRG: 566 | End: 2023-08-16
Attending: FAMILY MEDICINE | Admitting: FAMILY MEDICINE
Payer: COMMERCIAL

## 2023-08-15 DIAGNOSIS — Z37.9: ICD-10-CM

## 2023-08-15 DIAGNOSIS — Z88.2: ICD-10-CM

## 2023-08-15 DIAGNOSIS — O48.0: Primary | ICD-10-CM

## 2023-08-15 DIAGNOSIS — Z88.1: ICD-10-CM

## 2023-08-15 DIAGNOSIS — Z3A.40: ICD-10-CM

## 2023-08-15 DIAGNOSIS — Z88.8: ICD-10-CM

## 2023-08-15 LAB
HCT VFR BLD AUTO: 35.9 % (ref 37–47)
HGB BLD-MCNC: 12.1 G/DL (ref 12–16)
MCH RBC QN AUTO: 27.8 PG (ref 27–34)
MCHC RBC AUTO-ENTMCNC: 33.7 G/DL (ref 33–35)
MCHC RBC AUTO-ENTMCNC: 82.3 FL (ref 80–100)
PLATELET # BLD AUTO: 213 10^3/UL (ref 150–450)
RBC # BLD AUTO: 4.36 10^6/UL (ref 4.2–5.4)
WBC # BLD AUTO: 10.7 10^3/UL (ref 5–10)

## 2023-08-16 VITALS — HEART RATE: 64 BPM | DIASTOLIC BLOOD PRESSURE: 62 MMHG | SYSTOLIC BLOOD PRESSURE: 110 MMHG
